# Patient Record
Sex: FEMALE | Race: WHITE | URBAN - METROPOLITAN AREA
[De-identification: names, ages, dates, MRNs, and addresses within clinical notes are randomized per-mention and may not be internally consistent; named-entity substitution may affect disease eponyms.]

---

## 2022-11-03 ENCOUNTER — OFFICE VISIT (OUTPATIENT)
Dept: PRIMARY CARE CLINIC | Age: 47
End: 2022-11-03
Payer: COMMERCIAL

## 2022-11-03 VITALS
OXYGEN SATURATION: 99 % | HEART RATE: 73 BPM | BODY MASS INDEX: 35.58 KG/M2 | WEIGHT: 208.4 LBS | HEIGHT: 64 IN | TEMPERATURE: 97.7 F | DIASTOLIC BLOOD PRESSURE: 76 MMHG | SYSTOLIC BLOOD PRESSURE: 122 MMHG

## 2022-11-03 DIAGNOSIS — B37.0 ORAL THRUSH: Primary | ICD-10-CM

## 2022-11-03 DIAGNOSIS — R05.3 CHRONIC COUGH: ICD-10-CM

## 2022-11-03 PROCEDURE — 3078F DIAST BP <80 MM HG: CPT | Performed by: NURSE PRACTITIONER

## 2022-11-03 PROCEDURE — 3074F SYST BP LT 130 MM HG: CPT | Performed by: NURSE PRACTITIONER

## 2022-11-03 PROCEDURE — 99214 OFFICE O/P EST MOD 30 MIN: CPT | Performed by: NURSE PRACTITIONER

## 2022-11-03 RX ORDER — FAMOTIDINE 40 MG/1
40 TABLET, FILM COATED ORAL DAILY
COMMUNITY
Start: 2022-10-20

## 2022-11-03 RX ORDER — NIRMATRELVIR AND RITONAVIR 300-100 MG
KIT ORAL
COMMUNITY
Start: 2022-10-20 | End: 2022-11-03 | Stop reason: ALTCHOICE

## 2022-11-03 RX ORDER — SPIRONOLACTONE 100 MG/1
TABLET, FILM COATED ORAL
COMMUNITY
Start: 2019-08-28

## 2022-11-03 SDOH — ECONOMIC STABILITY: FOOD INSECURITY: WITHIN THE PAST 12 MONTHS, THE FOOD YOU BOUGHT JUST DIDN'T LAST AND YOU DIDN'T HAVE MONEY TO GET MORE.: NEVER TRUE

## 2022-11-03 SDOH — ECONOMIC STABILITY: TRANSPORTATION INSECURITY
IN THE PAST 12 MONTHS, HAS LACK OF TRANSPORTATION KEPT YOU FROM MEETINGS, WORK, OR FROM GETTING THINGS NEEDED FOR DAILY LIVING?: NO

## 2022-11-03 SDOH — ECONOMIC STABILITY: FOOD INSECURITY: WITHIN THE PAST 12 MONTHS, YOU WORRIED THAT YOUR FOOD WOULD RUN OUT BEFORE YOU GOT MONEY TO BUY MORE.: NEVER TRUE

## 2022-11-03 SDOH — ECONOMIC STABILITY: TRANSPORTATION INSECURITY
IN THE PAST 12 MONTHS, HAS THE LACK OF TRANSPORTATION KEPT YOU FROM MEDICAL APPOINTMENTS OR FROM GETTING MEDICATIONS?: NO

## 2022-11-03 ASSESSMENT — ENCOUNTER SYMPTOMS
SORE THROAT: 0
SINUS COMPLAINT: 1

## 2022-11-03 ASSESSMENT — PATIENT HEALTH QUESTIONNAIRE - PHQ9
SUM OF ALL RESPONSES TO PHQ9 QUESTIONS 1 & 2: 0
1. LITTLE INTEREST OR PLEASURE IN DOING THINGS: 0
2. FEELING DOWN, DEPRESSED OR HOPELESS: 0
SUM OF ALL RESPONSES TO PHQ QUESTIONS 1-9: 0

## 2022-11-03 ASSESSMENT — SOCIAL DETERMINANTS OF HEALTH (SDOH): HOW HARD IS IT FOR YOU TO PAY FOR THE VERY BASICS LIKE FOOD, HOUSING, MEDICAL CARE, AND HEATING?: NOT HARD AT ALL

## 2022-11-03 NOTE — PROGRESS NOTES
Subjective:      Patient ID: Nya Ann is a 56 y.o. female who presents today for:  Chief Complaint   Patient presents with    Cough     Cough, headache, fatigue, congestion, bilateral ear fullness; symptoms for two weeks. Has bad coughing fits, will be off and on with coughing and being fine. Wants to make sure lungs are okay from coughing. Pt here today and she reports coughing x almost 2 weeks. Dry cough and declines any SOB, chest pain noted. Pt shows no distress, declines any testing today. Pt just got over having covid on Oct 20th she was + pt reports. Cough  This is a new problem. The current episode started 1 to 4 weeks ago (x 2 weeks). The cough is Non-productive (dry). Associated symptoms include ear congestion (\"feels full\"), headaches (pt declines it si the worst HA of life or thunderclap in appearance), postnasal drip and rhinorrhea. Pertinent negatives include no chest pain, chills, ear pain, eye redness, fever, myalgias, nasal congestion, rash, sore throat, shortness of breath or wheezing. Nothing aggravates the symptoms. Treatments tried: otc meds, cough med. There is no history of asthma, bronchiectasis, bronchitis, COPD or pneumonia. Sinus Problem  This is a new problem. The problem is unchanged. There has been no fever. Her pain is at a severity of 0/10. She is experiencing no pain. Associated symptoms include coughing (dry and oiccasional) and headaches (pt declines it si the worst HA of life or thunderclap in appearance). Pertinent negatives include no chills, congestion, ear pain, shortness of breath, sinus pressure or sore throat. Treatments tried: essential oil. The treatment provided mild relief. Ear Fullness   There is pain in both ears. This is a new problem. The current episode started in the past 7 days. The problem occurs every few hours. The problem has been unchanged. There has been no fever.  Associated symptoms include coughing (dry and oiccasional), headaches (pt declines it si the worst HA of life or thunderclap in appearance) and rhinorrhea. Pertinent negatives include no abdominal pain, diarrhea, hearing loss, rash, sore throat or vomiting. Treatments tried: otc meds, cough med. The treatment provided mild relief. There is no history of a chronic ear infection, hearing loss or a tympanostomy tube. Past Medical History:   Diagnosis Date    Adenomyosis     Hypertension     RA (rheumatoid arthritis) ([facility] [address]      History reviewed. No pertinent surgical history. Social History     Socioeconomic History    Marital status:      Spouse name: Not on file    Number of children: Not on file    Years of education: Not on file    Highest education level: Not on file   Occupational History    Not on file   Tobacco Use    Smoking status: Never    Smokeless tobacco: Never   Substance and Sexual Activity    Alcohol use: Yes     Comment: Occ    Drug use: No    Sexual activity: Yes   Other Topics Concern    Not on file   Social History Narrative    Not on file     Social Determinants of Health     Financial Resource Strain: Low Risk     Difficulty of Paying Living Expenses: Not hard at all   Food Insecurity: No Food Insecurity    Worried About Running Out of Food in the Last Year: Never true    Clotilde of Food in the Last Year: Never true   Transportation Needs: No Transportation Needs    Lack of Transportation (Medical): No    Lack of Transportation (Non-Medical):  No   Physical Activity: Not on file   Stress: Not on file   Social Connections: Not on file   Intimate Partner Violence: Not on file   Housing Stability: Not on file     Family History   Problem Relation Age of Onset    Stroke Mother     High Blood Pressure Mother     Diabetes Mother     Heart Disease Father     Cancer Sister         beginning stages of ovarian cancer    Diabetes Sister     Thyroid Disease Sister      No Known Allergies      Review of Systems   Constitutional:  Positive for fatigue. Negative for activity change, appetite change, chills and fever. HENT:  Positive for postnasal drip and rhinorrhea. Negative for congestion, drooling, ear pain, hearing loss, sinus pressure, sinus pain, sore throat and trouble swallowing. Pt reports \"her ears feel full\"   Eyes:  Negative for redness, itching and visual disturbance. Respiratory:  Positive for cough (dry and oiccasional). Negative for apnea, chest tightness, shortness of breath and wheezing. Cardiovascular:  Negative for chest pain and palpitations. Gastrointestinal:  Negative for abdominal pain, constipation, diarrhea, nausea and vomiting. Endocrine: Negative for heat intolerance. Genitourinary:  Negative for difficulty urinating, flank pain, genital sores and urgency. Musculoskeletal:  Negative for arthralgias, gait problem, myalgias and neck stiffness. Skin:  Negative for rash. Neurological:  Positive for headaches (pt declines it si the worst HA of life or thunderclap in appearance). Negative for dizziness, tremors, seizures and facial asymmetry. Psychiatric/Behavioral:  Negative for behavioral problems, confusion and suicidal ideas. The patient is not hyperactive. All other systems reviewed and are negative. Objective:   /76 (Site: Left Upper Arm, Position: Sitting, Cuff Size: Large Adult)   Pulse 73   Temp 97.7 °F (36.5 °C) (Temporal)   Ht 5' 4\" (1.626 m)   Wt 208 lb 6.4 oz (94.5 kg)   LMP 10/30/2022   SpO2 99%   BMI 35.77 kg/m²     Physical Exam  Vitals and nursing note reviewed. Constitutional:       General: She is awake. She is not in acute distress. Appearance: Normal appearance. She is well-developed and well-groomed. She is obese. She is not ill-appearing, toxic-appearing or diaphoretic. HENT:      Head: Normocephalic and atraumatic. Right Ear: Hearing and tympanic membrane normal. No decreased hearing noted. No swelling or tenderness. No middle ear effusion.  Tympanic membrane is not injected, erythematous or bulging. Left Ear: Hearing and tympanic membrane normal. No decreased hearing noted. No swelling or tenderness. No middle ear effusion. Tympanic membrane is not injected, erythematous or bulging. Ears:      Comments: B/L WNL ear exam today       Nose: Rhinorrhea present. Mouth/Throat:      Lips: Pink. No lesions. Mouth: Mucous membranes are moist. No angioedema. Palate: No mass. Pharynx: Posterior oropharyngeal erythema present. No oropharyngeal exudate. Tonsils: No tonsillar exudate or tonsillar abscesses. Eyes:      Extraocular Movements: Extraocular movements intact. Conjunctiva/sclera: Conjunctivae normal.      Pupils: Pupils are equal, round, and reactive to light. Cardiovascular:      Rate and Rhythm: Normal rate and regular rhythm. Pulses: Normal pulses. Heart sounds: Normal heart sounds. No murmur heard. Pulmonary:      Effort: Pulmonary effort is normal. No respiratory distress. Breath sounds: Normal breath sounds. No wheezing or rhonchi. Chest:      Chest wall: No tenderness. Abdominal:      General: Abdomen is flat. Bowel sounds are normal. There is no distension. Palpations: Abdomen is soft. Tenderness: There is no abdominal tenderness. There is no guarding or rebound. Musculoskeletal:         General: No signs of injury. Normal range of motion. Cervical back: Normal range of motion. Skin:     General: Skin is warm and dry. Capillary Refill: Capillary refill takes less than 2 seconds. Findings: No bruising, erythema or rash. Neurological:      General: No focal deficit present. Mental Status: She is alert and oriented to person, place, and time. Mental status is at baseline. Motor: No weakness. Coordination: Coordination normal.   Psychiatric:         Mood and Affect: Mood normal.         Behavior: Behavior normal. Behavior is cooperative. Thought Content: Thought content normal.         Judgment: Judgment normal.       Assessment:       Diagnosis Orders   1. Oral thrush  nystatin (MYCOSTATIN) 500294 UNIT/ML suspension      2. Chronic cough  XR CHEST STANDARD (2 VW)            Plan:      Orders Placed This Encounter   Procedures    XR CHEST STANDARD (2 VW)     Standing Status:   Future     Number of Occurrences:   1     Standing Expiration Date:   11/3/2023     Order Specific Question:   Reason for exam:     Answer:   r/o post covid pneumonia       Orders Placed This Encounter   Medications    nystatin (MYCOSTATIN) 395571 UNIT/ML suspension     Sig: Take 5 mLs by mouth 4 times daily for 14 days Swish and swallow over 2 minutes. Dispense:  280 mL     Refill:  0     Pt here today and she declines any covid, flu or strep teting today. Pt declines any distress and aware to increase her fluids, rest and to rinse mouth out after she smokes, vapes. Pt aware of how to take the oral swish n swallow med and if her s/s worsen such as drooling, SOB, chest pain, high fevers, or trouble breathing, swallowing. Pt verbalized understanding of the [facility]. Discussed signs and symptoms which require immediate follow-up in ED/call to 911. Patient verbalized understanding. No follow-ups on file. Reviewed with the patient: current clinical status, medications, activities and diet. Side effects, adverse effects of the medication prescribed today, as well as treatment plan and result expectations have been discussed with the patient who expresses understanding and desires to proceed. Close follow up to evaluate treatment results and for coordination of care. I have reviewed the patient's medical history in detail and updated the computerized patient record.       Zhao Gerber, JAYLENE - CNP

## 2022-11-08 ASSESSMENT — ENCOUNTER SYMPTOMS
DIARRHEA: 0
NAUSEA: 0
WHEEZING: 0
SHORTNESS OF BREATH: 0
ABDOMINAL PAIN: 0
CHEST TIGHTNESS: 0
SINUS PRESSURE: 0
EYE ITCHING: 0
APNEA: 0
RHINORRHEA: 1
CONSTIPATION: 0
SINUS PAIN: 0
VOMITING: 0
EYE REDNESS: 0

## 2022-11-09 ASSESSMENT — ENCOUNTER SYMPTOMS
TROUBLE SWALLOWING: 0
COUGH: 1

## 2023-09-29 PROBLEM — R09.1 PLEURISY: Status: ACTIVE | Noted: 2023-09-29

## 2023-09-29 PROBLEM — J06.9 URI, ACUTE: Status: ACTIVE | Noted: 2023-09-29

## 2023-09-29 PROBLEM — H92.02 OTALGIA OF LEFT EAR: Status: ACTIVE | Noted: 2023-09-29

## 2023-09-29 RX ORDER — SPIRONOLACTONE 100 MG/1
TABLET, FILM COATED ORAL
COMMUNITY
Start: 2019-08-28

## 2023-09-29 RX ORDER — NAPROXEN 500 MG/1
1 TABLET ORAL EVERY 12 HOURS
COMMUNITY
Start: 2019-11-30

## 2023-09-29 RX ORDER — PROMETHAZINE HYDROCHLORIDE AND DEXTROMETHORPHAN HYDROBROMIDE 6.25; 15 MG/5ML; MG/5ML
SYRUP ORAL
COMMUNITY
Start: 2019-11-30

## 2023-09-29 RX ORDER — ALBUTEROL SULFATE 90 UG/1
AEROSOL, METERED RESPIRATORY (INHALATION)
COMMUNITY
Start: 2019-11-30

## 2023-10-03 ENCOUNTER — ALLIED HEALTH (OUTPATIENT)
Dept: INTEGRATIVE MEDICINE | Facility: CLINIC | Age: 48
End: 2023-10-03
Payer: COMMERCIAL

## 2023-10-03 VITALS
HEART RATE: 82 BPM | SYSTOLIC BLOOD PRESSURE: 126 MMHG | DIASTOLIC BLOOD PRESSURE: 82 MMHG | BODY MASS INDEX: 36.27 KG/M2 | WEIGHT: 208 LBS

## 2023-10-03 DIAGNOSIS — Z71.3 NUTRITIONAL COUNSELING: ICD-10-CM

## 2023-10-03 DIAGNOSIS — M54.2 NECK PAIN: Primary | ICD-10-CM

## 2023-10-03 DIAGNOSIS — R53.83 FATIGUE, UNSPECIFIED TYPE: ICD-10-CM

## 2023-10-03 DIAGNOSIS — F43.9 STRESS: ICD-10-CM

## 2023-10-03 PROCEDURE — 99215 OFFICE O/P EST HI 40 MIN: CPT | Performed by: PHYSICIAN ASSISTANT

## 2023-10-03 NOTE — PROGRESS NOTES
"Integrative Medicine Consult:    Successes:  - Saw megan for follow up on thyroid nodule-- no intervention at this time  - Avoiding processed/packaged foods as much as possible  - Eliminated sugar successfully   - Menses- time between cycle has reduced; no obvious change in flow (always heavy)  - Physical activity- variable; 3-4 days/week-- weight training circuit, 30 minutes cardio    Challenges:  - Nutrition- struggled a bit, back on keto-- not napping as much, BG better managed (90-95 mg/dL); only lost 5# in 2 months  - Feeling \"very inflamed\"    - Lost appetite with switching to keto, so not eating very much--- uses protein shakes PRN with poor appetite   - Neck pain from disc issues-- ibuprofen PRN, massage; hesitancy with chiro due to hx of injury; interested in PT and acupuncture   - Legs- painful, heavy, \"fat deposits\" and lumps-- looking into lipoedema specialist   - Sleep- waking up at 430a and cannot fall back asleep due to pain  - Reviewed causes of weight loss resistance. States she has considered Mounjaro.     Recent labs: (4/2023)  - CBC- MCV 88, H/H 12/38  - Ferritin 27- red meat 1-2x/week  - HsCRP- 3.3  - B12- 778  - A1C- 5.4  - CMP- WNL  - Chol (226), Trig (150), HDL 61,   - D- 50   - Fasting insulin- 10; SILVIANO-IR= 2.6  - ESR 43    Supplements:  - MVI  - compound MTHFR  - psyllium husk  - magnesium  - potassium  - NAC      Assessment:  - Challenges with weight loss  - Inflammation   - Painful legs-- low ferritin (27)-- repeat with increase of iron-rich foods  - Depressed mood      Plan:  See wrap up   -- will consider LDN vs. Cymbalta for pain and mood mgmt       ROS:  Constitutional: afebrile  Respiratory: no shortness of breath  Cardiovascular: no chest  pain  Abdominal: no abdominal pain, no n/v/d  Musculoskeletal: +chronic widespread pain   Skin: no rash    Physical Exam:  General: alert and oriented; well-developed, well-nourished, no acute distress  HEENT: normocephalic, atraumatic, good " eye contact  Respiratory: no labored breathing, no conversational dyspnea  Musculoskeletal: moving all extremities appropriately  Neurology: normal gait  Psych: pleasant affect, cooperative

## 2023-10-03 NOTE — PATIENT INSTRUCTIONS
Referral to acupuncture and physical therapy   Follow up with endocrinologist to discuss difficulty with weight loss and hormonal evaluation   Future considerations:  Repeat labs-- ferritin, iron, fasting glucose, fasting insulin   Food sensitivity testing   Medication/Supplement Considerations:  Cymbalta  Low dose naltrexone  Combination of turmeric/carolina/boswellia   Nutrition:  Consider 30 day dairy elimination   Explore alternative ways to activate the vagus nerve:  Cold water therapy:  Immerse your face in a bowl of ice water for 5-10 seconds at a time.  End your shower with 30-60 seconds of cold water, increase as tolerated.  Advance to cold baths/plunges when ready.  Look into Dr. Charito Oro's work to learn how to safely initiate cold water therapy- [email or web address]/live-thermalist    Music therapy:  Humming, singing, playing an instrument, or dancing  Resources:  Fast Like a Girl by Shweta Jean-Baptiste  The Menopause Reset by Shweta Jean-Baptiste

## 2023-10-19 ENCOUNTER — ALLIED HEALTH (OUTPATIENT)
Dept: INTEGRATIVE MEDICINE | Facility: CLINIC | Age: 48
End: 2023-10-19

## 2023-10-19 DIAGNOSIS — M54.2 NECK PAIN: ICD-10-CM

## 2023-10-19 DIAGNOSIS — F43.9 STRESS: ICD-10-CM

## 2023-10-19 PROCEDURE — ACUP-NP

## 2023-10-19 PROCEDURE — 97810 ACUP 1/> WO ESTIM 1ST 15 MIN: CPT

## 2023-10-19 PROCEDURE — 97811 ACUP 1/> W/O ESTIM EA ADD 15: CPT

## 2023-10-19 NOTE — PATIENT INSTRUCTIONS
Patient received a detailed treatment plan (1x/wk for 6-8wks) as well as what to expect following today's treatment (some soreness at treatment sites that should wear off within a day).  Patient was also encouraged to properly hydrate following today's treatment along with lifestyle improvement tips which included rest, stress and pain management.

## 2025-01-04 ENCOUNTER — LAB (OUTPATIENT)
Dept: LAB | Facility: LAB | Age: 50
End: 2025-01-04
Payer: COMMERCIAL

## 2025-01-04 ENCOUNTER — HOSPITAL ENCOUNTER (OUTPATIENT)
Dept: RADIOLOGY | Facility: CLINIC | Age: 50
Discharge: HOME | End: 2025-01-04
Payer: COMMERCIAL

## 2025-01-04 DIAGNOSIS — E78.00 PURE HYPERCHOLESTEROLEMIA, UNSPECIFIED: ICD-10-CM

## 2025-01-04 DIAGNOSIS — R63.5 ABNORMAL WEIGHT GAIN: ICD-10-CM

## 2025-01-04 DIAGNOSIS — R53.83 OTHER FATIGUE: Primary | ICD-10-CM

## 2025-01-04 DIAGNOSIS — J32.9 CHRONIC SINUSITIS, UNSPECIFIED: ICD-10-CM

## 2025-01-04 DIAGNOSIS — E55.9 VITAMIN D DEFICIENCY, UNSPECIFIED: ICD-10-CM

## 2025-01-04 DIAGNOSIS — K92.1 MELENA: ICD-10-CM

## 2025-01-04 LAB
25(OH)D3 SERPL-MCNC: 50 NG/ML (ref 30–100)
ALBUMIN SERPL BCP-MCNC: 4.2 G/DL (ref 3.4–5)
ALP SERPL-CCNC: 70 U/L (ref 33–110)
ALT SERPL W P-5'-P-CCNC: 14 U/L (ref 7–45)
ANION GAP SERPL CALC-SCNC: 13 MMOL/L (ref 10–20)
APPEARANCE UR: CLEAR
AST SERPL W P-5'-P-CCNC: 15 U/L (ref 9–39)
BASOPHILS # BLD AUTO: 0.05 X10*3/UL (ref 0–0.1)
BASOPHILS NFR BLD AUTO: 0.6 %
BILIRUB SERPL-MCNC: 0.7 MG/DL (ref 0–1.2)
BILIRUB UR STRIP.AUTO-MCNC: NEGATIVE MG/DL
BUN SERPL-MCNC: 13 MG/DL (ref 6–23)
CALCIUM SERPL-MCNC: 8.9 MG/DL (ref 8.6–10.3)
CHLORIDE SERPL-SCNC: 103 MMOL/L (ref 98–107)
CHOLEST SERPL-MCNC: 234 MG/DL (ref 0–199)
CHOLESTEROL/HDL RATIO: 3.8
CO2 SERPL-SCNC: 27 MMOL/L (ref 21–32)
COLOR UR: COLORLESS
CREAT SERPL-MCNC: 0.79 MG/DL (ref 0.5–1.05)
EGFRCR SERPLBLD CKD-EPI 2021: >90 ML/MIN/1.73M*2
EOSINOPHIL # BLD AUTO: 0.13 X10*3/UL (ref 0–0.7)
EOSINOPHIL NFR BLD AUTO: 1.5 %
ERYTHROCYTE [DISTWIDTH] IN BLOOD BY AUTOMATED COUNT: 13.7 % (ref 11.5–14.5)
ESTRADIOL SERPL-MCNC: 254 PG/ML
FSH SERPL-ACNC: 11.6 IU/L
GLUCOSE SERPL-MCNC: 89 MG/DL (ref 74–99)
GLUCOSE UR STRIP.AUTO-MCNC: NORMAL MG/DL
HCT VFR BLD AUTO: 37.3 % (ref 36–46)
HDLC SERPL-MCNC: 61.1 MG/DL
HGB BLD-MCNC: 12.1 G/DL (ref 12–16)
IMM GRANULOCYTES # BLD AUTO: 0.02 X10*3/UL (ref 0–0.7)
IMM GRANULOCYTES NFR BLD AUTO: 0.2 % (ref 0–0.9)
KETONES UR STRIP.AUTO-MCNC: NEGATIVE MG/DL
LDLC SERPL CALC-MCNC: 135 MG/DL
LEUKOCYTE ESTERASE UR QL STRIP.AUTO: NEGATIVE
LH SERPL-ACNC: 13 IU/L
LYMPHOCYTES # BLD AUTO: 2.63 X10*3/UL (ref 1.2–4.8)
LYMPHOCYTES NFR BLD AUTO: 30.6 %
MCH RBC QN AUTO: 29.2 PG (ref 26–34)
MCHC RBC AUTO-ENTMCNC: 32.4 G/DL (ref 32–36)
MCV RBC AUTO: 90 FL (ref 80–100)
MONOCYTES # BLD AUTO: 0.55 X10*3/UL (ref 0.1–1)
MONOCYTES NFR BLD AUTO: 6.4 %
NEUTROPHILS # BLD AUTO: 5.21 X10*3/UL (ref 1.2–7.7)
NEUTROPHILS NFR BLD AUTO: 60.7 %
NITRITE UR QL STRIP.AUTO: NEGATIVE
NON HDL CHOLESTEROL: 173 MG/DL (ref 0–149)
NRBC BLD-RTO: 0 /100 WBCS (ref 0–0)
PH UR STRIP.AUTO: 6 [PH]
PLATELET # BLD AUTO: 360 X10*3/UL (ref 150–450)
POTASSIUM SERPL-SCNC: 4.6 MMOL/L (ref 3.5–5.3)
PROT SERPL-MCNC: 6.9 G/DL (ref 6.4–8.2)
PROT UR STRIP.AUTO-MCNC: NEGATIVE MG/DL
RBC # BLD AUTO: 4.15 X10*6/UL (ref 4–5.2)
RBC # UR STRIP.AUTO: NEGATIVE /UL
SODIUM SERPL-SCNC: 138 MMOL/L (ref 136–145)
SP GR UR STRIP.AUTO: 1.01
T4 FREE SERPL-MCNC: 0.74 NG/DL (ref 0.61–1.12)
TRIGL SERPL-MCNC: 191 MG/DL (ref 0–149)
TSH SERPL-ACNC: 1.02 MIU/L (ref 0.44–3.98)
UROBILINOGEN UR STRIP.AUTO-MCNC: NORMAL MG/DL
VLDL: 38 MG/DL (ref 0–40)
WBC # BLD AUTO: 8.6 X10*3/UL (ref 4.4–11.3)

## 2025-01-04 PROCEDURE — 85025 COMPLETE CBC W/AUTO DIFF WBC: CPT

## 2025-01-04 PROCEDURE — 70210 X-RAY EXAM OF SINUSES: CPT

## 2025-01-04 PROCEDURE — 83001 ASSAY OF GONADOTROPIN (FSH): CPT

## 2025-01-04 PROCEDURE — 82670 ASSAY OF TOTAL ESTRADIOL: CPT

## 2025-01-04 PROCEDURE — 83002 ASSAY OF GONADOTROPIN (LH): CPT

## 2025-01-04 PROCEDURE — 84439 ASSAY OF FREE THYROXINE: CPT

## 2025-01-04 PROCEDURE — 82306 VITAMIN D 25 HYDROXY: CPT

## 2025-01-04 PROCEDURE — 84432 ASSAY OF THYROGLOBULIN: CPT

## 2025-01-04 PROCEDURE — 80053 COMPREHEN METABOLIC PANEL: CPT

## 2025-01-04 PROCEDURE — 84443 ASSAY THYROID STIM HORMONE: CPT

## 2025-01-04 PROCEDURE — 86800 THYROGLOBULIN ANTIBODY: CPT

## 2025-01-04 PROCEDURE — 81003 URINALYSIS AUTO W/O SCOPE: CPT

## 2025-01-04 PROCEDURE — 80061 LIPID PANEL: CPT

## 2025-01-04 PROCEDURE — 36415 COLL VENOUS BLD VENIPUNCTURE: CPT

## 2025-01-07 LAB
THYROGLOB AB SERPL-ACNC: <1 IU/ML (ref 0–0.9)
THYROGLOB SERPL-MCNC: 40.9 NG/ML (ref 1.5–38.5)

## 2025-01-09 ENCOUNTER — LAB (OUTPATIENT)
Dept: LAB | Facility: LAB | Age: 50
End: 2025-01-09
Payer: COMMERCIAL

## 2025-01-09 DIAGNOSIS — R63.5 ABNORMAL WEIGHT GAIN: ICD-10-CM

## 2025-01-09 DIAGNOSIS — R53.83 OTHER FATIGUE: ICD-10-CM

## 2025-01-09 DIAGNOSIS — K92.1 MELENA: ICD-10-CM

## 2025-01-09 DIAGNOSIS — E78.00 PURE HYPERCHOLESTEROLEMIA, UNSPECIFIED: ICD-10-CM

## 2025-01-09 DIAGNOSIS — E55.9 VITAMIN D DEFICIENCY, UNSPECIFIED: ICD-10-CM

## 2025-01-09 PROCEDURE — 82274 ASSAY TEST FOR BLOOD FECAL: CPT

## 2025-01-12 LAB — HEMOCCULT STL QL IA: NEGATIVE

## 2025-01-15 ENCOUNTER — APPOINTMENT (OUTPATIENT)
Dept: INTEGRATIVE MEDICINE | Facility: CLINIC | Age: 50
End: 2025-01-15
Payer: COMMERCIAL

## 2025-02-25 ENCOUNTER — APPOINTMENT (OUTPATIENT)
Dept: CARDIOLOGY | Facility: CLINIC | Age: 50
End: 2025-02-25
Payer: COMMERCIAL

## 2025-02-25 VITALS
HEART RATE: 91 BPM | HEIGHT: 64 IN | BODY MASS INDEX: 37.05 KG/M2 | SYSTOLIC BLOOD PRESSURE: 128 MMHG | DIASTOLIC BLOOD PRESSURE: 80 MMHG | WEIGHT: 217 LBS

## 2025-02-25 DIAGNOSIS — R00.2 PALPITATIONS: ICD-10-CM

## 2025-02-25 PROCEDURE — 1036F TOBACCO NON-USER: CPT | Performed by: INTERNAL MEDICINE

## 2025-02-25 PROCEDURE — 99204 OFFICE O/P NEW MOD 45 MIN: CPT | Performed by: INTERNAL MEDICINE

## 2025-02-25 PROCEDURE — 93000 ELECTROCARDIOGRAM COMPLETE: CPT | Performed by: INTERNAL MEDICINE

## 2025-02-25 RX ORDER — TRAMADOL HYDROCHLORIDE 50 MG/1
50 TABLET ORAL
COMMUNITY
Start: 2025-02-22 | End: 2025-03-24

## 2025-02-25 RX ORDER — CELECOXIB 100 MG/1
100 CAPSULE ORAL 2 TIMES DAILY
COMMUNITY
Start: 2025-02-22

## 2025-02-25 NOTE — PROGRESS NOTES
CARDIOLOGY NEW PATIENT OFFICE VISIT    Patient:  Kelly Mcdaniels  YOB: 1975  MRN: [id number]       Chief Complaint/Active Symptoms:       Kelly Mcdaniels is a 50 y.o. female who is being seen today at the request of primary care physician for evaluation of palpitations.    No chief complaint on file.    This a 50-year-old female who actually was here in 2018 with similar complaints.  She was having palpitations at the time.  Monitoring showed simple PACs and PVCs.  Stress test and echo were normal except for some mild valvular regurgitation.  Patient was on beta-blocker for a while but that discontinued.  She did not follow-up regularly for many years.  Apparently she noticed palpitations again exacerbating in the last 3 months.  Subsequently she was then found to have a thyroid nodule on an incidental study and has seen the endocrinologist who is going to repeat some blood work and other testing in the next month or 2.  She says she is just feeling a bit off.  She feels a bit lightheaded at times and dizzy but has not actually passed out.  She is having similar type of feeling today in the office despite having normal blood pressure pulse and EKG.    Again patient's past cardiac history is negative except for PACs and PVCs.  She has had chronic palpitations for many years.  At one time she was on Adipex but has held this recently.  This was for weight loss.  We did explain that this could be contributory to irregular heartbeats and she should reconsider that in the future.    Noncardiac history is otherwise relatively benign.  Apparently she has some type of rheumatoid arthritis for which she sees an rheumatologist at the [facility] but is only on Celebrex and tramadol.  She has a history of anxiety and depression.  She has moderate to significant obesity.  She has not been a smoker.  She she denies any other major GI  renal left logic urologic endocrine hematologic vascular pulmonary  abnormalities no other cardiac abnormality.    Social history negative this time.  Open no drug or smoking use.    Family history includes stroke hypertension diabetes but no clear-cut heart attack or cardiovascular illnesses per se.    Meds are as listed.    Allergies to medications are none.    Remaining system review is noncontributory  History of Present Illness:     3 months of increasing palpitations  Allergies:     No Known Allergies     Outpatient Medications:     Current Outpatient Medications   Medication Instructions    albuterol 90 mcg/actuation inhaler inhalation    naproxen (Naprosyn) 500 mg tablet 1 tablet, oral, Every 12 hours    promethazine-DM (Phenergan-DM) 6.25-15 mg/5 mL syrup oral    spironolactone (Aldactone) 100 mg tablet oral        Past Medical History:     Past Medical History:   Diagnosis Date    Personal history of diseases of the skin and subcutaneous tissue 11/30/2019    History of acne       Social History:      Negative.    Family History:   No family history on file.    Review of Systems:     12 point review of systems completed and is negative other than that detailed above.     Objective:     There were no vitals filed for this visit.    Wt Readings from Last 4 Encounters:   10/03/23 94.3 kg (208 lb)   04/05/23 93.4 kg (206 lb)       Physical Examination:   Constitutional:       Appearance: Healthy appearance. Not in distress.   Neck:      Vascular: No JVR. JVD normal.   Pulmonary:      Effort: Pulmonary effort is normal.      Breath sounds: Normal breath sounds. No wheezing. No rhonchi. No rales.   Chest:      Chest wall: Not tender to palpatation.   Cardiovascular:      PMI at left midclavicular line. Normal rate. Regular rhythm. Normal S1. Normal S2.       Murmurs: There is a mid frequency blowing midsystolic murmur at the URSB, LLSB, ULSB and apex.      No gallop.  No click. No rub.   Pulses:     Intact distal pulses.   Edema:     Peripheral edema absent.   Abdominal:       "General: Bowel sounds are normal.      Palpations: Abdomen is soft.      Tenderness: There is no abdominal tenderness.   Musculoskeletal: Normal range of motion.         General: No tenderness. Skin:     General: Skin is warm and dry.   Neurological:      General: No focal deficit present.      Mental Status: Alert and oriented to person, place and time.          Lab:     CBC:   Lab Results   Component Value Date    WBC 8.6 01/04/2025    RBC 4.15 01/04/2025    HGB 12.1 01/04/2025    HCT 37.3 01/04/2025     01/04/2025        CMP:    Lab Results   Component Value Date     01/04/2025    K 4.6 01/04/2025     01/04/2025    CO2 27 01/04/2025    BUN 13 01/04/2025    CREATININE 0.79 01/04/2025    GLUCOSE 89 01/04/2025    CALCIUM 8.9 01/04/2025       Magnesium:    No results found for: \"MG\"    Lipid Profile:    Lab Results   Component Value Date    TRIG 191 (H) 01/04/2025    HDL 61.1 01/04/2025    LDLCALC 135 (H) 01/04/2025    LDLDIRECT 107 01/30/2020       TSH:    Lab Results   Component Value Date    TSH 1.02 01/04/2025       BNP:   No results found for: \"BNP\"     PT/INR:    No results found for: \"PROTIME\", \"INR\"    HgBA1c:    Lab Results   Component Value Date    HGBA1C 5.4 04/12/2023       BMP:  Lab Results   Component Value Date     01/04/2025     (L) 01/30/2020    K 4.6 01/04/2025    K 4.4 01/30/2020     01/04/2025     01/30/2020    CO2 27 01/04/2025    CO2 25 01/30/2020    BUN 13 01/04/2025    BUN 12 01/30/2020    CREATININE 0.79 01/04/2025    CREATININE 0.80 01/30/2020       CBC:  Lab Results   Component Value Date    WBC 8.6 01/04/2025    WBC 8.4 01/30/2020    RBC 4.15 01/04/2025    RBC 4.03 01/30/2020    HGB 12.1 01/04/2025    HGB 12.4 01/30/2020    HCT 37.3 01/04/2025    HCT 39.3 01/30/2020    MCV 90 01/04/2025    MCV 98 01/30/2020    MCH 29.2 01/04/2025    MCHC 32.4 01/04/2025    MCHC 31.6 (L) 01/30/2020    RDW 13.7 01/04/2025    RDW 13.6 01/30/2020     01/04/2025 " "    01/30/2020       Cardiac Enzymes:    No results found for: \"TROPHS\"    Hepatic Function Panel:    Lab Results   Component Value Date    ALKPHOS 70 01/04/2025    ALT 14 01/04/2025    AST 15 01/04/2025    PROT 6.9 01/04/2025    BILITOT 0.7 01/04/2025       Diagnostic Studies:     XR paranasal sinuses 1-2 views    Result Date: 1/6/2025  Interpreted By:  Falguni Brown, STUDY: XR PARANASAL SINUSES 1-2 VIEWS;  1/4/2025 10:44 am   INDICATION: Signs/Symptoms:J32.9.   ,J32.9 Chronic sinusitis, unspecified   COMPARISON: None.   ACCESSION NUMBER(S): [id number]   ORDERING CLINICIAN: YOLIE DUNCAN   FINDINGS: Frontal and lateral views of the sinuses obtained.   Obliquity on lateral view limits evaluation. The paranasal sinuses are normally developed as visualized. No definite sinus air-fluid levels identified.       Limited exam. Paranasal sinuses are normally developed as visualized.   MACRO: None.   Signed by: Falguni Brown 1/6/2025 8:57 AM Dictation workstation:   [id number]      Radiology:     No orders to display       Problem List:     Patient Active Problem List   Diagnosis    URI, acute    Pleurisy    Otalgia of left ear       Assessment:         50-year-old female here for evaluation of recurrent palpitations.    Meds, vitals, examination as noted.    Chart review details cussed the patient at length.    Impression:  Chronic recurrent palpitations  Thyroid abnormality with possible nodule undergoing therapy and evaluation at this time with Dr. Snowden  Obesity  Lightheadedness and dizziness  Rheumatoid arthritis  Plan:   Recommendation:  Discussed at length with patient prior findings and recommended repeating testing to compare to before  Current labs are stable do not need to be repeated  Follow-up with Dr. Snowden since thyroid disease may be contributory to the patient's symptomatology  Check blood pressure and pulse at home when symptomatic  Upon completion of the echo and stress test and monitoring " patient will return to the office to review make any additional recommendations  Avoid any stimulants at this time such as Adipex  Did discuss use of semaglutide as weight loss type medication which she can confer with Dr. Snowden about in the future  Any major condition change notify us and her primary doctor

## 2025-02-26 ENCOUNTER — TELEPHONE (OUTPATIENT)
Dept: CARDIOLOGY | Facility: CLINIC | Age: 50
End: 2025-02-26
Payer: COMMERCIAL

## 2025-03-11 ENCOUNTER — APPOINTMENT (OUTPATIENT)
Dept: CARDIOLOGY | Facility: CLINIC | Age: 50
End: 2025-03-11
Payer: COMMERCIAL

## 2025-03-19 ENCOUNTER — TELEPHONE (OUTPATIENT)
Dept: CARDIOLOGY | Facility: CLINIC | Age: 50
End: 2025-03-19

## 2025-03-19 ENCOUNTER — HOSPITAL ENCOUNTER (OUTPATIENT)
Dept: RADIOLOGY | Facility: CLINIC | Age: 50
Discharge: HOME | End: 2025-03-19
Payer: COMMERCIAL

## 2025-03-19 ENCOUNTER — HOSPITAL ENCOUNTER (OUTPATIENT)
Dept: CARDIOLOGY | Facility: CLINIC | Age: 50
Discharge: HOME | End: 2025-03-19
Payer: COMMERCIAL

## 2025-03-19 DIAGNOSIS — R00.2 PALPITATIONS: ICD-10-CM

## 2025-03-19 LAB
AORTIC VALVE MEAN GRADIENT: 5 MMHG
AORTIC VALVE PEAK VELOCITY: 1.61 M/S
AV PEAK GRADIENT: 10 MMHG
AVA (PEAK VEL): 2.56 CM2
AVA (VTI): 2.46 CM2
EJECTION FRACTION APICAL 4 CHAMBER: 66.7
EJECTION FRACTION: 68 %
LEFT VENTRICLE INTERNAL DIMENSION DIASTOLE: 3.94 CM (ref 3.5–6)
LEFT VENTRICULAR OUTFLOW TRACT DIAMETER: 2 CM
LV EJECTION FRACTION BIPLANE: 65 %
MITRAL VALVE E/A RATIO: 0.91
RIGHT VENTRICLE PEAK SYSTOLIC PRESSURE: 32.8 MMHG

## 2025-03-19 PROCEDURE — 3430000001 HC RX 343 DIAGNOSTIC RADIOPHARMACEUTICALS: Performed by: INTERNAL MEDICINE

## 2025-03-19 PROCEDURE — 93306 TTE W/DOPPLER COMPLETE: CPT | Performed by: INTERNAL MEDICINE

## 2025-03-19 PROCEDURE — A9502 TC99M TETROFOSMIN: HCPCS | Performed by: INTERNAL MEDICINE

## 2025-03-19 PROCEDURE — 78452 HT MUSCLE IMAGE SPECT MULT: CPT

## 2025-03-19 PROCEDURE — 93017 CV STRESS TEST TRACING ONLY: CPT

## 2025-03-19 PROCEDURE — 93306 TTE W/DOPPLER COMPLETE: CPT

## 2025-03-19 RX ADMIN — TETROFOSMIN 9.5 MILLICURIE: 0.23 INJECTION, POWDER, LYOPHILIZED, FOR SOLUTION INTRAVENOUS at 09:38

## 2025-03-19 RX ADMIN — TETROFOSMIN 35.3 MILLICURIE: 0.23 INJECTION, POWDER, LYOPHILIZED, FOR SOLUTION INTRAVENOUS at 10:55

## 2025-03-19 NOTE — TELEPHONE ENCOUNTER
Pt stopped in office after stress test. Pt states the Ziopatch she wore irritated her skin and she did remove it. She attempted to reapply but it did not work. Pt states she will mail the monitor back and will see what shows on the monitor. She states she wore the monitor approx one week.   MATTHEW for Dr Reed

## 2025-03-24 ENCOUNTER — TELEPHONE (OUTPATIENT)
Dept: CARDIOLOGY | Facility: CLINIC | Age: 50
End: 2025-03-24
Payer: COMMERCIAL

## 2025-03-24 NOTE — TELEPHONE ENCOUNTER
Patient left a voicemail at the [place] office regarding the results to recent stress test and echo she had done. Patient is asking for a return call with the results.   Riana Rodriguez MA

## 2025-03-25 NOTE — TELEPHONE ENCOUNTER
Per Dr. Eric Reed, DO ECHO and stress test results are normal.  We are currently waiting for Ziopatch monitor results and once those are in we will call patient. I did call patient today to give her ECHO and Stress test results and l/m to call back the office for results.

## 2025-04-28 ENCOUNTER — APPOINTMENT (OUTPATIENT)
Dept: NEUROLOGY | Facility: CLINIC | Age: 50
End: 2025-04-28
Payer: COMMERCIAL

## 2025-06-04 ENCOUNTER — DOCUMENTATION (OUTPATIENT)
Dept: CARDIOLOGY | Facility: CLINIC | Age: 50
End: 2025-06-04
Payer: COMMERCIAL